# Patient Record
(demographics unavailable — no encounter records)

---

## 2019-06-20 NOTE — OP
DATE OF PROCEDURE:  06/19/2019



PREOPERATIVE DIAGNOSES:  

1. Chronic otitis media with effusion.

2. Bilateral eustachian tube dysfunction.

3. Adenoid hypertrophy.



POSTOPERATIVE DIAGNOSES:  

1. Chronic otitis media with effusion.

2. Bilateral eustachian tube dysfunction.

3. Adenoid hypertrophy.



PROCEDURES PERFORMED:  

1. Bilateral myringotomy with tube placement.

2. Adenoidectomy.



ANESTHESIA:  GETA.



ESTIMATED BLOOD LOSS:  Zero mL.



COMPLICATIONS:  None.



PROCEDURE IN DETAIL:  Patient was taken to the operating room and placed supine on

the table.  General endotracheal anesthesia was obtained by the anesthesia staff.

Tube was secured in the midline.  The operating microscope was brought into the

field.  Attention was turned to the left ear.  The ear speculum was placed in the

external auditory canal.  Wax was removed from the external auditory canal.  The TM

was noted to be plastered with a thick mucoid effusion.  A radial type incision was

made in the anterior inferior quadrant.  Thick mucoid effusion was suctioned.

Tympanostomy tube was placed, and Floxin otic drops were placed into the ear.  An

identical procedure was performed on the right ear. 

Following this, the head of the bed was turned 90 degrees.  A shoulder roll was

placed.  A Emmanuel-Matt mouth gag was introduced in the oral cavity and was

retracted, taking care to protect the lips, teeth, and gums.  A Red Kenn-Chelly was

placed through the nasal cavity and retracted through the oral cavity.  The indirect

laryngeal mirror was used to 

visualize the adenoid pad, which was noted to be enlarged.  The uvula and soft

palate were intact. The suction Bovie was then used to remove the adenoid pad.  Cool

saline was then irrigated through the oral cavity and nasopharynx.  Orogastric tube

was placed, and gastric contents were suctioned.  The patient tolerated the

procedure well. 







Job ID:  [id number]